# Patient Record
Sex: FEMALE | Race: WHITE | NOT HISPANIC OR LATINO | Employment: UNEMPLOYED | URBAN - METROPOLITAN AREA
[De-identification: names, ages, dates, MRNs, and addresses within clinical notes are randomized per-mention and may not be internally consistent; named-entity substitution may affect disease eponyms.]

---

## 2019-05-07 ENCOUNTER — NON-PROVIDER VISIT (OUTPATIENT)
Dept: HEALTH INFORMATION MANAGEMENT | Facility: MEDICAL CENTER | Age: 39
End: 2019-05-07
Payer: COMMERCIAL

## 2019-05-07 VITALS — BODY MASS INDEX: 45.88 KG/M2 | HEIGHT: 62 IN | WEIGHT: 249.3 LBS

## 2019-05-07 DIAGNOSIS — I10 ESSENTIAL (PRIMARY) HYPERTENSION: ICD-10-CM

## 2019-05-07 DIAGNOSIS — E11.9 TYPE 2 DIABETES MELLITUS WITHOUT COMPLICATION, UNSPECIFIED WHETHER LONG TERM INSULIN USE (HCC): ICD-10-CM

## 2019-05-07 PROCEDURE — 97802 MEDICAL NUTRITION INDIV IN: CPT | Performed by: DIETITIAN, REGISTERED

## 2019-05-07 NOTE — PROGRESS NOTES
"5/7/2019 Referring Provider: Getachew Taylor M.D.       Time in/out: 2:00-2:50pm     Anthropometrics/Objective  Vitals:    05/07/19 1451   Weight: 113.1 kg (249 lb 4.8 oz)   Height: 1.575 m (5' 2\")       Body mass index is 45.6 kg/m².      Stated Goal Weight: NA    See comprehensive patient history form for further information     Subjective:  -Pt had gastric sleeve surgery in [place] last February 2018   -Reports losing 74lb but has regained 8-10lb in the past few months   -Highest weight 314lb; lowest was 240lb   -Pt states being able to tolerate foods again has contributed to weight gain   -Has started to snack on oreo cookies , chips, and popcorn after work and in evenings after dinner    -Never saw a dietitian before or after her bariatric surgery   -Drinks water, coffee with SF creamer and gatorade zero   -Usually picks up port of subs for breakfast and has 1/2 6\" for B and the other 1/2 for lunch   -Wife and her both cook dinner       Nutrition Diagnosis (PES Statement)  · Food/nutrition knowledge deficit related to no previous nutrition education as evidenced by limited nutrition knowledge per discussion with patient    Client history:  Condition(s) associated with a diagnosis or treatment (specify) Type 2 DM, HTN, hx of gastric sleeve surgery     Biochemical data, medical test and procedures  No results found for: HBA1C@  No results found for: POCGLUCOSE  No results found for: CHOLSTRLTOT, LDL, HDL, TRIGLYCERIDE      Nutrition Intervention  Nutrition Prescription  Recommended Daily Kcals 1200kcal based on post bariatric surgery guidelines   Recommended Daily Protein: 80-90g ~25-30% of kcals     Meal and Snack  Recommend a high protein, low calorie, carb controlled diet     Comprehensive Nutrition education Instruction or training leading to in-depth nutrition related knowledge about:  Combine carb, protein and fat at each meal, Eating out, Meal timing and spacing, Metabolism of carb, protein, fat, Physical " activity/exercise, Portion control, Sweets and alcohol in moderation, Increasing/Decreasing PO intake, Handouts provided regarding topics discussed and Theraputic diet for gastric sleeve surgery     Monitoring & Evaluation Plan    Behavioral-Environmental:  Behavior : Consider keeping a food journal such as on My fitness pal to track intake   Physical activity : Exercise x3 per week (hike on weekends, bike 1 week day)     Food / Nutrient Intake:  Fluid/Beverage intake: Avoid sweetened beverages. 64oz water or more. Avoid carbonated beverage. Separate liquids from solids by 30mins     Food intake   1. Eat 3 meals and 3 small snacks daily   2. Slow down when eating.  3. Chew your food well.  4. Use smaller plates/bowls/utensils to help eat smaller portions.  5. Decrease your intake of high fat/high sugar foods.  6. Eat more fruits and vegetables.  7. Use sugar substitutes like Stevia or Splenda.  8. Eat protein foods first and include at each meal and snack         9.  Use plate method for portion (3oz protein, 1/2 c ns veggies, 1/4 c starch or fruit)        10. Healthier, high protein snacks 2-3x per day ; no sweets    Physical Signs / Symptoms:  Weight change: -1-2lb per week     Assessment Notes:  Stephanie has started to get off track and she recognizes this about her eating habits. She has been eating excess calories from unhealthy snacks such as cookies and chips. Today we reviewed all post bariatric surgery dietary guidelines since pt had not previous education from a RD. Also recommended specific things she will need to do to avoid ongoing weight gain. She agreed to stop eating sweets all together x2 weeks and will get those tempting foods out of the house. Provided her with a list of healthier alternatives.   She will also try keeping a food journal which we can review upon follow up and set additional goals as needed.     Follow up 4 weeks   Lizzy Lundy, RD, LD, CDE  [phone]

## 2020-07-13 ENCOUNTER — NON-PROVIDER VISIT (OUTPATIENT)
Dept: URGENT CARE | Facility: PHYSICIAN GROUP | Age: 40
End: 2020-07-13

## 2020-07-13 DIAGNOSIS — Z02.1 PRE-EMPLOYMENT DRUG SCREENING: ICD-10-CM

## 2020-07-13 LAB
AMP AMPHETAMINE: NORMAL
COC COCAINE: NORMAL
INT CON NEG: NORMAL
INT CON POS: NORMAL
MET METHAMPHETAMINES: NORMAL
OPI OPIATES: NORMAL
PCP PHENCYCLIDINE: NORMAL
POC DRUG COMMENT 753798-OCCUPATIONAL HEALTH: NORMAL
THC: NORMAL

## 2020-07-13 PROCEDURE — 80305 DRUG TEST PRSMV DIR OPT OBS: CPT | Performed by: PHYSICIAN ASSISTANT
